# Patient Record
Sex: FEMALE | Race: OTHER | HISPANIC OR LATINO | URBAN - METROPOLITAN AREA
[De-identification: names, ages, dates, MRNs, and addresses within clinical notes are randomized per-mention and may not be internally consistent; named-entity substitution may affect disease eponyms.]

---

## 2018-11-06 ENCOUNTER — EMERGENCY (EMERGENCY)
Facility: HOSPITAL | Age: 43
LOS: 1 days | Discharge: HOME | End: 2018-11-06
Attending: EMERGENCY MEDICINE | Admitting: EMERGENCY MEDICINE
Payer: MEDICAID

## 2018-11-06 VITALS
TEMPERATURE: 98 F | HEART RATE: 62 BPM | DIASTOLIC BLOOD PRESSURE: 83 MMHG | OXYGEN SATURATION: 100 % | SYSTOLIC BLOOD PRESSURE: 139 MMHG | RESPIRATION RATE: 18 BRPM

## 2018-11-06 LAB
BASOPHILS # BLD AUTO: 0.02 K/UL — SIGNIFICANT CHANGE UP (ref 0–0.2)
BASOPHILS NFR BLD AUTO: 0.1 % — SIGNIFICANT CHANGE UP (ref 0–2)
EOSINOPHIL # BLD AUTO: 0 K/UL — SIGNIFICANT CHANGE UP (ref 0–0.5)
EOSINOPHIL NFR BLD AUTO: 0 % — SIGNIFICANT CHANGE UP (ref 0–6)
HCT VFR BLD CALC: 36.3 % — SIGNIFICANT CHANGE UP (ref 34.5–45)
HGB BLD-MCNC: 12.3 G/DL — SIGNIFICANT CHANGE UP (ref 11.5–15.5)
IMM GRANULOCYTES # BLD AUTO: 0.08 # — SIGNIFICANT CHANGE UP
IMM GRANULOCYTES NFR BLD AUTO: 0.4 % — SIGNIFICANT CHANGE UP (ref 0–1.5)
LYMPHOCYTES # BLD AUTO: 0.53 K/UL — LOW (ref 1–3.3)
LYMPHOCYTES # BLD AUTO: 2.7 % — LOW (ref 13–44)
MCHC RBC-ENTMCNC: 31.5 PG — SIGNIFICANT CHANGE UP (ref 27–34)
MCHC RBC-ENTMCNC: 33.9 % — SIGNIFICANT CHANGE UP (ref 32–36)
MCV RBC AUTO: 92.8 FL — SIGNIFICANT CHANGE UP (ref 80–100)
MONOCYTES # BLD AUTO: 0.65 K/UL — SIGNIFICANT CHANGE UP (ref 0–0.9)
MONOCYTES NFR BLD AUTO: 3.4 % — SIGNIFICANT CHANGE UP (ref 2–14)
NEUTROPHILS # BLD AUTO: 18.01 K/UL — HIGH (ref 1.8–7.4)
NEUTROPHILS NFR BLD AUTO: 93.4 % — HIGH (ref 43–77)
NRBC # FLD: 0 — SIGNIFICANT CHANGE UP
PLATELET # BLD AUTO: 447 K/UL — HIGH (ref 150–400)
PMV BLD: 9.3 FL — SIGNIFICANT CHANGE UP (ref 7–13)
RBC # BLD: 3.91 M/UL — SIGNIFICANT CHANGE UP (ref 3.8–5.2)
RBC # FLD: 14 % — SIGNIFICANT CHANGE UP (ref 10.3–14.5)
WBC # BLD: 19.29 K/UL — HIGH (ref 3.8–10.5)
WBC # FLD AUTO: 19.29 K/UL — HIGH (ref 3.8–10.5)

## 2018-11-06 PROCEDURE — 99220: CPT

## 2018-11-06 RX ORDER — EPINEPHRINE 0.3 MG/.3ML
0.3 INJECTION INTRAMUSCULAR; SUBCUTANEOUS ONCE
Qty: 0 | Refills: 0 | Status: COMPLETED | OUTPATIENT
Start: 2018-11-06 | End: 2018-11-06

## 2018-11-06 RX ORDER — SODIUM CHLORIDE 9 MG/ML
1000 INJECTION INTRAMUSCULAR; INTRAVENOUS; SUBCUTANEOUS
Qty: 0 | Refills: 0 | Status: DISCONTINUED | OUTPATIENT
Start: 2018-11-06 | End: 2018-11-10

## 2018-11-06 RX ORDER — SODIUM CHLORIDE 9 MG/ML
1000 INJECTION INTRAMUSCULAR; INTRAVENOUS; SUBCUTANEOUS ONCE
Qty: 0 | Refills: 0 | Status: COMPLETED | OUTPATIENT
Start: 2018-11-06 | End: 2018-11-06

## 2018-11-06 RX ORDER — FAMOTIDINE 10 MG/ML
20 INJECTION INTRAVENOUS ONCE
Qty: 0 | Refills: 0 | Status: COMPLETED | OUTPATIENT
Start: 2018-11-06 | End: 2018-11-06

## 2018-11-06 RX ADMIN — EPINEPHRINE 0.3 MILLIGRAM(S): 0.3 INJECTION INTRAMUSCULAR; SUBCUTANEOUS at 20:22

## 2018-11-06 RX ADMIN — SODIUM CHLORIDE 1000 MILLILITER(S): 9 INJECTION INTRAMUSCULAR; INTRAVENOUS; SUBCUTANEOUS at 20:22

## 2018-11-06 RX ADMIN — FAMOTIDINE 20 MILLIGRAM(S): 10 INJECTION INTRAVENOUS at 20:22

## 2018-11-06 NOTE — ED PROVIDER NOTE - MEDICAL DECISION MAKING DETAILS
Patient with itching, difficulty breathing, epig pain/vomiting/lightheadedness s/p allergy injection earlier today, hypotensive in pmds office, improved s/p prehospital benadryl, steroids, fluids, now stable, no e/o airway threat, still with nausea, lightheadedness, will give epi. fluids, and pepcid, monitor closely, reass.

## 2018-11-06 NOTE — ED CDU PROVIDER INITIAL DAY NOTE - MEDICAL DECISION MAKING DETAILS
43 year old female with a PMHx of seasonal allergies pw throat tightness, SOB, diffuse pruritic rash, vomiting after getting an allergy shot today at her PCPs  Plan: observe for rebound anaphylaxis

## 2018-11-06 NOTE — ED PROVIDER NOTE - OBJECTIVE STATEMENT
43f presents with multiple symptoms. Patient was given an allergy shot at her pmds office, within minutes developed diffuse itching, difficulty breathing, throat itching, nausea, vomiting, and epigastric pain. Went back into her pmds office who took her vs, noted her to be hypotensive, gave her 50mg benadryl and a steroid im, and called ems. Patients symptoms started to improve, bp improved prior to arrival per pmd, and patient notes the difficulty breathing and throat symptoms have resolved. STill feeling nauseated, lightheaded. Denies any ha, cp, diarrhea, dysuria. Has h/o shellfish allergy which had self resolved over the years, no known other allergies. occurred approx 2 hours prior.

## 2018-11-06 NOTE — ED PROVIDER NOTE - PROGRESS NOTE DETAILS
patient feeling much better after medications, agrees to go to cdu for further management and observation.

## 2018-11-06 NOTE — ED ADULT NURSE NOTE - OBJECTIVE STATEMENT
Pt was receiving allergy shots and developed hives all over and throat tightness--pt treated by EMS--given benadryl and Solumedrol--pt arrives in rm 7--given epi IM and pepcid IV--pt has #20 infusing NS into lt antecubital--will continue to monitor

## 2018-11-06 NOTE — ED CDU PROVIDER INITIAL DAY NOTE - OBJECTIVE STATEMENT
43 year old female with a PMHx of seasonal allergies 43 year old female with a PMHx of seasonal allergies pw throat tightness, SOB, diffuse pruritic rash, vomiting after getting an allergy shot today at her PCPs. She was found to be hypotensive at her PCPs office and was given Benadryl and IM steroids and BIBA to the ED. Symptoms improved on her way to ED but was given IM Epi and pepcid and sent to the CDU for monitoring.

## 2018-11-06 NOTE — ED ADULT NURSE REASSESSMENT NOTE - NS ED NURSE REASSESS COMMENT FT1
Pt reports significant relief in s/s post medication administration. Respirations even and unlabored. Pt denies feeling throat is tight, states just feels hoarse. Family at bedside. Pt made aware to report any change in s/s, verbalizes understanding.

## 2018-11-06 NOTE — ED CDU PROVIDER INITIAL DAY NOTE - ATTENDING CONTRIBUTION TO CARE
Patient presented with itching, difficulty breathing, hypotension, vomiting s/p injection of allergy shot at pcp office, improved somewhat with prehospital benadryl and steroids, vs on ed eval, still with symptoms of nausea, lightheadedness, given epi, pepcid with further improvement of symptoms. Now feeling much better. exam post meds with vss, ao3, airway patent, no drooling/stridor, uvula  normal size and midline, rrr, ctabl, abd soft nt/nd, ext with from, skin with no rash. plan to observe in cdu and continue treatment.

## 2018-11-06 NOTE — ED ADULT TRIAGE NOTE - CHIEF COMPLAINT QUOTE
Pt brought in by EMS from her PMD office for allergic reaction. As per EMS pt received the seasonal allergy shot, afterward c/o tightness to throat with itchiness. Pt received Depo-medrol 40mg IM from MD office and benadryl 50mg IVP by EMS prior to arrival with relief. No stridor/drooling noted. Also c/o lower abdominal pain.

## 2018-11-06 NOTE — CHART NOTE - NSCHARTNOTEFT_GEN_A_CORE
ER visit  Chief Complaint:    Pt was send in by EMS from my office  for allergic reaction. pt received the seasonal allergy shot it was new vial after 20 mint,  c/o tightness to throat with itchiness. her o2 sat 94 %and BP 92/60 INITIALLY .   Pt received Depo-medrol 40mg IM IN THE  office and benadryl 50mg IVP by EMS prior to arrival with relief. No stridor/drooling noted. Also c/o lower abdominal pain. SHE VOMITED X2      Extended Triage:   Vital Signs:  · BP Systolic  139 mm Hg  · BP Diastolic  83 mm Hg  · Heart Rate  62 /min  · Respiration Rate (breaths/min)  18 /min  · Temp (F)  97.5 Degrees F  · Temp (C)  36.4 Degrees C  · Temp site  oral  · SpO2 (%)  100 %  · O2 delivery  room air    LUNGS  clear  ASS/PLAN:  allergic reaction   DW ER TEAM  AND PT

## 2018-11-07 VITALS
HEART RATE: 74 BPM | TEMPERATURE: 98 F | SYSTOLIC BLOOD PRESSURE: 106 MMHG | DIASTOLIC BLOOD PRESSURE: 70 MMHG | OXYGEN SATURATION: 100 % | RESPIRATION RATE: 15 BRPM

## 2018-11-07 LAB
ALBUMIN SERPL ELPH-MCNC: 3.9 G/DL — SIGNIFICANT CHANGE UP (ref 3.3–5)
ALP SERPL-CCNC: 59 U/L — SIGNIFICANT CHANGE UP (ref 40–120)
ALT FLD-CCNC: 11 U/L — SIGNIFICANT CHANGE UP (ref 4–33)
AST SERPL-CCNC: 12 U/L — SIGNIFICANT CHANGE UP (ref 4–32)
BASOPHILS # BLD AUTO: 0.03 K/UL — SIGNIFICANT CHANGE UP (ref 0–0.2)
BASOPHILS NFR BLD AUTO: 0.2 % — SIGNIFICANT CHANGE UP (ref 0–2)
BILIRUB SERPL-MCNC: 0.3 MG/DL — SIGNIFICANT CHANGE UP (ref 0.2–1.2)
BUN SERPL-MCNC: 12 MG/DL — SIGNIFICANT CHANGE UP (ref 7–23)
CALCIUM SERPL-MCNC: 8.4 MG/DL — SIGNIFICANT CHANGE UP (ref 8.4–10.5)
CHLORIDE SERPL-SCNC: 105 MMOL/L — SIGNIFICANT CHANGE UP (ref 98–107)
CO2 SERPL-SCNC: 20 MMOL/L — LOW (ref 22–31)
CREAT SERPL-MCNC: 0.69 MG/DL — SIGNIFICANT CHANGE UP (ref 0.5–1.3)
EOSINOPHIL # BLD AUTO: 0.02 K/UL — SIGNIFICANT CHANGE UP (ref 0–0.5)
EOSINOPHIL NFR BLD AUTO: 0.1 % — SIGNIFICANT CHANGE UP (ref 0–6)
GLUCOSE SERPL-MCNC: 104 MG/DL — HIGH (ref 70–99)
HBA1C BLD-MCNC: 5.2 % — SIGNIFICANT CHANGE UP (ref 4–5.6)
HCT VFR BLD CALC: 37.5 % — SIGNIFICANT CHANGE UP (ref 34.5–45)
HGB BLD-MCNC: 12.5 G/DL — SIGNIFICANT CHANGE UP (ref 11.5–15.5)
IMM GRANULOCYTES # BLD AUTO: 0.08 # — SIGNIFICANT CHANGE UP
IMM GRANULOCYTES NFR BLD AUTO: 0.5 % — SIGNIFICANT CHANGE UP (ref 0–1.5)
LYMPHOCYTES # BLD AUTO: 1.12 K/UL — SIGNIFICANT CHANGE UP (ref 1–3.3)
LYMPHOCYTES # BLD AUTO: 6.5 % — LOW (ref 13–44)
MCHC RBC-ENTMCNC: 31.2 PG — SIGNIFICANT CHANGE UP (ref 27–34)
MCHC RBC-ENTMCNC: 33.3 % — SIGNIFICANT CHANGE UP (ref 32–36)
MCV RBC AUTO: 93.5 FL — SIGNIFICANT CHANGE UP (ref 80–100)
MONOCYTES # BLD AUTO: 0.43 K/UL — SIGNIFICANT CHANGE UP (ref 0–0.9)
MONOCYTES NFR BLD AUTO: 2.5 % — SIGNIFICANT CHANGE UP (ref 2–14)
NEUTROPHILS # BLD AUTO: 15.68 K/UL — HIGH (ref 1.8–7.4)
NEUTROPHILS NFR BLD AUTO: 90.2 % — HIGH (ref 43–77)
NRBC # FLD: 0 — SIGNIFICANT CHANGE UP
PLATELET # BLD AUTO: 478 K/UL — HIGH (ref 150–400)
PMV BLD: 9.3 FL — SIGNIFICANT CHANGE UP (ref 7–13)
POTASSIUM SERPL-MCNC: 4.6 MMOL/L — SIGNIFICANT CHANGE UP (ref 3.5–5.3)
POTASSIUM SERPL-SCNC: 4.6 MMOL/L — SIGNIFICANT CHANGE UP (ref 3.5–5.3)
PROT SERPL-MCNC: 6.6 G/DL — SIGNIFICANT CHANGE UP (ref 6–8.3)
RBC # BLD: 4.01 M/UL — SIGNIFICANT CHANGE UP (ref 3.8–5.2)
RBC # FLD: 14.2 % — SIGNIFICANT CHANGE UP (ref 10.3–14.5)
SODIUM SERPL-SCNC: 139 MMOL/L — SIGNIFICANT CHANGE UP (ref 135–145)
WBC # BLD: 17.36 K/UL — HIGH (ref 3.8–10.5)
WBC # FLD AUTO: 17.36 K/UL — HIGH (ref 3.8–10.5)

## 2018-11-07 PROCEDURE — 99217: CPT

## 2018-11-07 RX ORDER — IBUPROFEN 200 MG
600 TABLET ORAL ONCE
Qty: 0 | Refills: 0 | Status: COMPLETED | OUTPATIENT
Start: 2018-11-07 | End: 2018-11-07

## 2018-11-07 RX ORDER — EPINEPHRINE 0.3 MG/.3ML
1 INJECTION INTRAMUSCULAR; SUBCUTANEOUS
Qty: 1 | Refills: 0 | OUTPATIENT
Start: 2018-11-07

## 2018-11-07 RX ORDER — FAMOTIDINE 10 MG/ML
1 INJECTION INTRAVENOUS
Qty: 8 | Refills: 0 | OUTPATIENT
Start: 2018-11-07 | End: 2018-11-10

## 2018-11-07 RX ADMIN — Medication 600 MILLIGRAM(S): at 01:26

## 2018-11-07 RX ADMIN — Medication 600 MILLIGRAM(S): at 02:30

## 2018-11-07 NOTE — ED CDU PROVIDER SUBSEQUENT DAY NOTE - ATTENDING CONTRIBUTION TO CARE
43F p/w allergic reaction – pmhx seasonal allergies – p/w anaphylaxis.  Has been getting allergy shots by PMD.  Today may have gotten more of a dose, got SOB, pruritic rash, throat tightening, vomiting; went back in, was hypotensive, got benadryl and steroids, got epi here.  Since then feeling better, likely d/c this AM, rx epipen, steroids, benadryl q6, pepcid.    VS:  unremarkable    GEN - NAD; well appearing; A+O x3   HEAD - NC/AT     ENT - PEERL, EOMI, mucous membranes  moist , no discharge      NECK: Neck supple, non-tender without lymphadenopathy, no masses, no JVD  PULM - CTA b/l,  symmetric breath sounds  COR -  normal heart sounds    ABD - , ND, NT, soft,  BACK - no CVA tenderness, nontender spine     EXTREMS - no edema, no deformity, warm and well perfused    SKIN - no rash or bruising      NEUROLOGIC - alert, CN 2-12 intact, sensation nl, motor no focal deficit.

## 2018-11-07 NOTE — PROGRESS NOTE ADULT - SUBJECTIVE AND OBJECTIVE BOX
CHIEF COMPLAINT:    SUBJECTIVE:     REVIEW OF SYSTEMS:    CONSTITUTIONAL: (  )  weakness,  (  ) fevers or chills  EYES/ENT: (  )visual changes;     NECK: (  ) pain or stiffness  RESPIRATORY:   (  )cough, wheezing, hemoptysis;  (  ) shortness of breath  CARDIOVASCULAR:  (  )chest pain or palpitations  GASTROINTESTINAL:   (  )abdominal or epigastric pain.  (  ) nausea, vomiting, or hematemesis;   (   ) diarrhea or constipation.   GENITOURINARY:   (    ) dysuria, frequency or hematuria  NEUROLOGICAL:  (   ) numbness or weakness   All other review of systems is negative unless indicated above    Vital Signs Last 24 Hrs  T(C): 36.8 (07 Nov 2018 05:37), Max: 36.9 (06 Nov 2018 21:30)  T(F): 98.2 (07 Nov 2018 05:37), Max: 98.5 (06 Nov 2018 21:30)  HR: 74 (07 Nov 2018 05:37) (62 - 96)  BP: 106/70 (07 Nov 2018 05:37) (106/70 - 139/83)  BP(mean): --  RR: 15 (07 Nov 2018 05:37) (15 - 18)  SpO2: 100% (07 Nov 2018 05:37) (100% - 100%)    I&O's Summary      CAPILLARY BLOOD GLUCOSE          PHYSICAL EXAM:    Constitutional:  (   ) NAD,   (   )awake and alert  HEENT: PERR, EOMI,    Neck: Soft and supple, No LAD, No JVD  Respiratory:  (    Breath sounds are clear bilaterally,    (   ) wheezing, rales or rhonchi  Cardiovascular:     (   )S1 and S2, regular rate and rhythm, no Murmurs, gallops or rubs  Gastrointestinal:  (   )Bowel Sounds present, soft,   (  )nontender, nondistended,    Extremities:    (  ) peripheral edema  Vascular: 2+ peripheral pulses  Neurological:    (    )A/O x 3,   (  ) focal deficits  Musculoskeletal:    (   )  normal strength b/l upper  (     ) normal  lower extremities  Skin: No rashes    MEDICATIONS:  MEDICATIONS  (STANDING):  sodium chloride 0.9%. 1000 milliLiter(s) (100 mL/Hr) IV Continuous <Continuous>      LABS: All Labs Reviewed:                        12.3   19.29 )-----------( 447      ( 06 Nov 2018 23:49 )             36.3     11-06    139  |  105  |  12  ----------------------------<  104<H>  4.6   |  20<L>  |  0.69    Ca    8.4      06 Nov 2018 23:49    TPro  6.6  /  Alb  3.9  /  TBili  0.3  /  DBili  x   /  AST  12  /  ALT  11  /  AlkPhos  59  11-06          Blood Culture:   Urine Culture      RADIOLOGY/EKG:    ASSESSMENT AND PLAN:    DVT PPX:    ADVANCED DIRECTIVE:    DISPOSITION: CHIEF COMPLAINT:    SUBJECTIVE:     REVIEW OF SYSTEMS:       · CONSTITUTIONAL: no fever and no chills.	  · CARDIOVASCULAR: no chest pain and no edema.	  · RESPIRATORY: no chest pain, no cough, and no shortness of breath.	  · GASTROINTESTINAL: no abdominal pain, no bloating, no constipation, no diarrhea, no nausea and no vomiting.	  · MUSCULOSKELETAL: no back pain, no gout, no musculoskeletal pain, no neck pain, and no weakness.	  · SKIN: no abrasions, no jaundice, no lesions, no pruritis, and no rashes.	  · NEURO: no loss of consciousness, no gait abnormality, no headache, no sensory deficits, and no weakness.	      Vital Signs Last 24 Hrs  T(C): 36.8 (07 Nov 2018 05:37), Max: 36.9 (06 Nov 2018 21:30)  T(F): 98.2 (07 Nov 2018 05:37), Max: 98.5 (06 Nov 2018 21:30)  HR: 74 (07 Nov 2018 05:37) (62 - 96)  BP: 106/70 (07 Nov 2018 05:37) (106/70 - 139/83)  BP(mean): --  RR: 15 (07 Nov 2018 05:37) (15 - 18)  SpO2: 100% (07 Nov 2018 05:37) (100% - 100%)    I&O's Summary      CAPILLARY BLOOD GLUCOSE          PHYSICAL EXAM:    Constitutional:  (  x ) NAD,   (   )awake and alert  HEENT: PERR, EOMI,    Neck: Soft and supple, No LAD, No JVD  Respiratory:  (    Breath sounds are clear bilaterally,    (   ) wheezing, rales or rhonchi  Cardiovascular:     (   )S1 and S2, regular rate and rhythm, no Murmurs, gallops or rubs  Gastrointestinal:  (   )Bowel Sounds present, soft,   (  )nontender, nondistended,    Extremities:    (  ) peripheral edema  Vascular: 2+ peripheral pulses  Neurological:    (    )A/O x 3,   (  ) focal deficits  Musculoskeletal:    (   )  normal strength b/l upper  (     ) normal  lower extremities  Skin:  Skin normal color for race, warm, dry and intact. No evidence of rash.    MEDICATIONS:  MEDICATIONS  (STANDING):  sodium chloride 0.9%. 1000 milliLiter(s) (100 mL/Hr) IV Continuous <Continuous>      LABS: All Labs Reviewed:                        12.3   19.29 )-----------( 447      ( 06 Nov 2018 23:49 )             36.3     11-06    139  |  105  |  12  ----------------------------<  104<H>  4.6   |  20<L>  |  0.69    Ca    8.4      06 Nov 2018 23:49    TPro  6.6  /  Alb  3.9  /  TBili  0.3  /  DBili  x   /  AST  12  /  ALT  11  /  AlkPhos  59  11-06          Blood Culture:   Urine Culture      RADIOLOGY/EKG:    ASSESSMENT AND PLAN:  #1 status post ananaphylaxis stable with  rx epipen, steroids, benadry  #2 leukocytosis discuss with ER  team and   follow as outpatient  DVT PPX:    ADVANCED DIRECTIVE:    DISPOSITION: CHIEF COMPLAINT:  Pt was send in by EMS from my office  for allergic reaction. pt received the seasonal allergy shot it was new vial after 20 mint,  c/o tightness to throat with itchiness. her o2 sat 94 %and BP 92/60 INITIALLY .   Pt received Depo-medrol 40mg IM IN THE  office and benadryl 50mg IVP by EMS prior to arrival with relief. No stridor/drooling noted. Also c/o lower abdominal pain. SHE VOMITED X2  SUBJECTIVE:     REVIEW OF SYSTEMS:       · CONSTITUTIONAL: no fever and no chills.	  · CARDIOVASCULAR: no chest pain and no edema.	  · RESPIRATORY: no chest pain, no cough, and no shortness of breath.	  · GASTROINTESTINAL: no abdominal pain, no bloating, no constipation, no diarrhea, no nausea and no vomiting.	  · MUSCULOSKELETAL: no back pain, no gout, no musculoskeletal pain, no neck pain, and no weakness.	  · SKIN: no abrasions, no jaundice, no lesions, no pruritis, and no rashes.	  · NEURO: no loss of consciousness, no gait abnormality, no headache, no sensory deficits, and no weakness.	      Vital Signs Last 24 Hrs  T(C): 36.8 (07 Nov 2018 05:37), Max: 36.9 (06 Nov 2018 21:30)  T(F): 98.2 (07 Nov 2018 05:37), Max: 98.5 (06 Nov 2018 21:30)  HR: 74 (07 Nov 2018 05:37) (62 - 96)  BP: 106/70 (07 Nov 2018 05:37) (106/70 - 139/83)  BP(mean): --  RR: 15 (07 Nov 2018 05:37) (15 - 18)  SpO2: 100% (07 Nov 2018 05:37) (100% - 100%)    I&O's Summary      CAPILLARY BLOOD GLUCOSE          PHYSICAL EXAM:    Constitutional:  (  x ) NAD,   (   )awake and alert  HEENT: PERR, EOMI,    Neck: Soft and supple, No LAD, No JVD  Respiratory:  (    Breath sounds are clear bilaterally,    (   ) wheezing, rales or rhonchi  Cardiovascular:     (   )S1 and S2, regular rate and rhythm, no Murmurs, gallops or rubs  Gastrointestinal:  (   )Bowel Sounds present, soft,   (  )nontender, nondistended,    Extremities:    (  ) peripheral edema  Vascular: 2+ peripheral pulses  Neurological:    (    )A/O x 3,   (  ) focal deficits  Musculoskeletal:    (   )  normal strength b/l upper  (     ) normal  lower extremities  Skin:  Skin normal color for race, warm, dry and intact. No evidence of rash.    MEDICATIONS:  MEDICATIONS  (STANDING):  sodium chloride 0.9%. 1000 milliLiter(s) (100 mL/Hr) IV Continuous <Continuous>      LABS: All Labs Reviewed:                        12.3   19.29 )-----------( 447      ( 06 Nov 2018 23:49 )             36.3     11-06    139  |  105  |  12  ----------------------------<  104<H>  4.6   |  20<L>  |  0.69    Ca    8.4      06 Nov 2018 23:49    TPro  6.6  /  Alb  3.9  /  TBili  0.3  /  DBili  x   /  AST  12  /  ALT  11  /  AlkPhos  59  11-06          Blood Culture:   Urine Culture      RADIOLOGY/EKG:    ASSESSMENT AND PLAN:  #1 status post ananaphylaxis stable with  rx epipen, steroids, benadry  #2 leukocytosis discuss with ER  team and   follow as outpatient  DVT PPX:    ADVANCED DIRECTIVE:    DISPOSITION:

## 2018-11-07 NOTE — ED CDU PROVIDER SUBSEQUENT DAY NOTE - HISTORY
VERONICA Camejo: Received signout from VERONICA Chery  43yF w/pmhx seasonal allergies presented to ED from PMDs office with anaphylaxis (vomiting, throat tightness) following allergy shot. In CDU for observation. Likely d/c this morning

## 2018-11-07 NOTE — ED CDU PROVIDER DISPOSITION NOTE - PLAN OF CARE
Follow up with your primary care provider within 48 hours  Follow up with an allergist, referral information provided  Take Prednisone 40mg Follow up with your primary care provider within 48 hours  Follow up with an allergist, referral information provided  Take Prednisone 40mg (2 tablets) daily for 4 days  Take Pepcid 20mg (1 tablet) twice daily for 4 days  Take Benadryl 25mg (1 tablet) every 6 hours as needed for allergic reaction (itching, throat swelling)  An EpiPen was prescribed to you, use only in cases of severe allergic reaction and then come to the ER  Return to the ER with any worsening or concerning symptoms, difficulty breathing, throat swelling, rash, fever, vomiting or any other concerns.

## 2018-11-07 NOTE — ED CDU PROVIDER SUBSEQUENT DAY NOTE - PROGRESS NOTE DETAILS
VERONICA Chery: pt NAD, VSS, no acute complaints. Pt resting comfortably. No sign of rebound anaphylaxis. Will continue to monitor. Discussed d/c home with PMD followup, sent pepcid and prednisone to pts pharmacy. Upon d/c pts PMD would like her to have repeat CBC, he will follow the results ()

## 2018-11-07 NOTE — ED CDU PROVIDER DISPOSITION NOTE - CLINICAL COURSE
43F p/w allergic reaction – pmhx seasonal allergies – p/w anaphylaxis.  Has been getting allergy shots by PMD.  Today may have gotten more of a dose, got SOB, pruritic rash, throat tightening, vomiting; went back in, was hypotensive, got benadryl and steroids, got epi here.  Since then feeling better, likely d/c this AM, rx epipen, steroids, benadryl q6, pepcid.

## 2018-11-07 NOTE — ED CDU PROVIDER SUBSEQUENT DAY NOTE - MEDICAL DECISION MAKING DETAILS
43yF w/pmhx seasonal allergies p/w ananaphylaxis following allergy injection. In CDU for observation following epi administration

## 2019-04-09 PROBLEM — Z00.00 ENCOUNTER FOR PREVENTIVE HEALTH EXAMINATION: Status: ACTIVE | Noted: 2019-04-09

## 2019-04-23 ENCOUNTER — APPOINTMENT (OUTPATIENT)
Dept: OTOLARYNGOLOGY | Facility: CLINIC | Age: 44
End: 2019-04-23
Payer: MEDICAID

## 2019-04-23 VITALS
SYSTOLIC BLOOD PRESSURE: 118 MMHG | TEMPERATURE: 98.1 F | BODY MASS INDEX: 23.19 KG/M2 | HEART RATE: 73 BPM | DIASTOLIC BLOOD PRESSURE: 81 MMHG | WEIGHT: 126 LBS | OXYGEN SATURATION: 98 % | HEIGHT: 62 IN

## 2019-04-23 DIAGNOSIS — Z82.49 FAMILY HISTORY OF ISCHEMIC HEART DISEASE AND OTHER DISEASES OF THE CIRCULATORY SYSTEM: ICD-10-CM

## 2019-04-23 DIAGNOSIS — K21.9 GASTRO-ESOPHAGEAL REFLUX DISEASE W/OUT ESOPHAGITIS: ICD-10-CM

## 2019-04-23 DIAGNOSIS — Z78.9 OTHER SPECIFIED HEALTH STATUS: ICD-10-CM

## 2019-04-23 DIAGNOSIS — J30.89 OTHER ALLERGIC RHINITIS: ICD-10-CM

## 2019-04-23 DIAGNOSIS — Z80.9 FAMILY HISTORY OF MALIGNANT NEOPLASM, UNSPECIFIED: ICD-10-CM

## 2019-04-23 PROCEDURE — 99204 OFFICE O/P NEW MOD 45 MIN: CPT | Mod: 25

## 2019-04-23 PROCEDURE — 31575 DIAGNOSTIC LARYNGOSCOPY: CPT

## 2019-04-23 RX ORDER — RANITIDINE 150 MG/1
150 TABLET ORAL
Qty: 60 | Refills: 5 | Status: ACTIVE | COMMUNITY
Start: 2019-04-23 | End: 1900-01-01

## 2019-04-23 NOTE — PROCEDURE
[de-identified] : Indication: requirement for exam not possible via anterior examination; hoarse\par After verbal consent and the administration of an aerosolized phenylephrine/lidocaine mix, examination was performed with a flexible endoscope placed through the nose. Findings:\par Nasopharynx: unremarkable\par Soft palate, lateral and posterior pharyngeal walls: unremarkable\par Base of tongue & lingual tonsil: moderate retrodisplacement\par Vallecula: unremarkable\par Epiglottis: unremarkable\par Piriform sinuses and pharyngoesophageal junction: unremarkable\par Arytenoids and AE folds: mod interarytenoid edema\par Ventricle and false vocal folds: effaced bilat\par True vocal folds: Doughy TVFs; surface without ectasias; normal movement bilaterally with good apposition in phonation\par Visible subglottis: unremarkable\par Other findings: ELM, pseudosulcus

## 2019-04-23 NOTE — ASSESSMENT
[FreeTextEntry1] : Reviewed reflux precautions and provided the patient with the corresponding educational handout. Discussed proper voice warmup and care before singing and recommended ongoing counseling by a .\par Discussed allergen mitigation and provided the patient with the corresponding educational handout. Cont flonase\par RTC 6 wks, sooner prn

## 2019-04-23 NOTE — HISTORY OF PRESENT ILLNESS
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 This worsens throughout the day but is also problematic in the morning. Lots of dry cough and clearing. Denies heartburn. Hasn't seen her  lately. \par Hx of pna x2 (4 y/a) but denies aspiration. \par Receiving AIT from an allergist in [place]. Reacts to her dogs; no known food allergies. Uses flonase daily.

## 2021-08-19 NOTE — ED CDU PROVIDER INITIAL DAY NOTE - FAMILY HISTORY
lens makespherecylinderaxisaddBCDIAMVAInt VANVExaminer No pertinent family history in first degree relatives

## 2023-03-27 NOTE — ED ADULT NURSE NOTE - NS ED NURSE DC PT EDUCATION RESOURCES
Physical Therapy Visit    Visit Type: Daily Treatment Note  Visit: 4  Referring Provider: Darryn Salgado MD  Medical Diagnosis (from order): Diagnosis Information    Diagnosis  V43.64 (ICD-9-CM) - Z96.641 (ICD-10-CM) - Presence of right artificial hip joint         SUBJECTIVE                                                                                                               Pt. Had a R ADAM 2/14/23.  She had home therapy for two weeks.  She reports no pain at rest and with walking 2-3/10.  She has some difficulty getting in and out of the car and with donning her socks and shoes   3/16/23 I feel pretty good.   3/21/23 I feel good.  i've been walking more and it feels good.   03- pt states she was walking a lot over the weekend and pain levels was elevated. Today she is better     Pain / Symptoms  - Pain rating (out of 10): Current: 2 ; Best: 0; Worst: 3      OBJECTIVE                                                                                                                                       Treatment     Therapeutic Exercise  Supine Heel Slide - 3 sets - 10 reps NP  Supine Bridge with Mini Swiss Ball Between Knees - 2 sets - 10 reps - 5 hold  Supine Single Bent Knee Fallout - 2 sets - 10 reps  Supine Hamstring Stretch with Strap -  2 reps - 20 seconds  Side Stepping with Resistance at Ankles - 1 x daily - 7 x weekly - 3 sets - 10 reps  Hip Flexor Stretch on Step - 2 x daily - 7 x weekly - 1 sets - 2 reps - 20 hold  Gastroc Stretch on Step - 2 reps - 20 hold   Supine hamstring stretch with strap  - 2 20 sec hold  Bike x 6 mins  TG squats - 20   TG ER - 20   TG SL - 10     Manual Therapy   Foam roll massage to R hamstring    Therapeutic Activity  SLB on foam with vectors  Step ups  Side step ups   Bridge with adduction   Walking lunges with rotation   Back step with band        ASSESSMENT                                                                                                             3/16/23 Good response to strengthening exercises.  Gait pattern is improved but still antalgic   3/21/23 VC's needed to rotate hips and swing her arms during gait.  No complaintss of pain.   3- tolerated session fairly well with decreased tension and improved ROM end of the session    Pain/symptoms after session (out of 10): 1 and 0  Education:   - Present and ready to learn: patient      Goals  Decreased ambulatory performance   Hip joint pain/stiffness   Hip mobility deficits   Hip weakness   The above improvements in impairments to assist in obtaining goals listed below  Long Term Goals: to be met by end of plan of care  1. Patient will demonstrate ability to ambulate on level and unlevel surfaces at variable velocities, including change of direction as appropriate without use of assistive device to allow for return to age appropriate and community activities at prior level of function.  2. Patient will ascend and descend one flight of stairs using reciprocal pattern and railings as appropriate for safety without demonstrating compensation patterns due to current right hip impairments.      3. Patient will achieve adequate right hip and knee mobility for squatting to an adequate depth to allow for performance of ADLs requiring lifting items from the floor without compensation patterns.  4. Patient will complete independent sit-stand transfers without postural compensation due to current right hip impairments to allow for independent household/community mobility.  5. Lower Extremity Functional Scale: Patient will complete form to reflect an improved raw score to greater than or equal to 65/80 to indicate patient reported improvement in function/disability/impairment (minimal detectable change: 9 points).  6. Patient will be independent with progressed and modified home exercise program.      Therapy procedure time and total treatment time can be found documented on the Time Entry flowsheet     Yes

## 2023-11-24 ENCOUNTER — NON-APPOINTMENT (OUTPATIENT)
Age: 48
End: 2023-11-24

## 2025-01-24 ENCOUNTER — APPOINTMENT (OUTPATIENT)
Dept: OTOLARYNGOLOGY | Facility: CLINIC | Age: 50
End: 2025-01-24
Payer: MEDICAID

## 2025-01-24 ENCOUNTER — NON-APPOINTMENT (OUTPATIENT)
Age: 50
End: 2025-01-24

## 2025-01-24 VITALS
HEIGHT: 62 IN | HEART RATE: 71 BPM | SYSTOLIC BLOOD PRESSURE: 141 MMHG | WEIGHT: 124.13 LBS | BODY MASS INDEX: 22.84 KG/M2 | TEMPERATURE: 98.7 F | DIASTOLIC BLOOD PRESSURE: 92 MMHG | OXYGEN SATURATION: 97 %

## 2025-01-24 DIAGNOSIS — H91.90 UNSPECIFIED HEARING LOSS, UNSPECIFIED EAR: ICD-10-CM

## 2025-01-24 DIAGNOSIS — K21.9 GASTRO-ESOPHAGEAL REFLUX DISEASE W/OUT ESOPHAGITIS: ICD-10-CM

## 2025-01-24 DIAGNOSIS — J30.89 OTHER ALLERGIC RHINITIS: ICD-10-CM

## 2025-01-24 DIAGNOSIS — H61.23 IMPACTED CERUMEN, BILATERAL: ICD-10-CM

## 2025-01-24 PROCEDURE — 99204 OFFICE O/P NEW MOD 45 MIN: CPT | Mod: 25

## 2025-01-24 PROCEDURE — 31575 DIAGNOSTIC LARYNGOSCOPY: CPT

## 2025-01-24 RX ORDER — FAMOTIDINE 20 MG/1
20 TABLET, FILM COATED ORAL
Qty: 180 | Refills: 3 | Status: ACTIVE | COMMUNITY
Start: 2025-01-24 | End: 1900-01-01

## 2025-01-28 ENCOUNTER — APPOINTMENT (OUTPATIENT)
Dept: PHARMACY | Facility: CLINIC | Age: 50
End: 2025-01-28

## 2025-01-28 ENCOUNTER — APPOINTMENT (OUTPATIENT)
Dept: OTOLARYNGOLOGY | Facility: CLINIC | Age: 50
End: 2025-01-28